# Patient Record
Sex: MALE | Race: WHITE | URBAN - METROPOLITAN AREA
[De-identification: names, ages, dates, MRNs, and addresses within clinical notes are randomized per-mention and may not be internally consistent; named-entity substitution may affect disease eponyms.]

---

## 2022-05-15 ENCOUNTER — HOSPITAL ENCOUNTER (EMERGENCY)
Age: 37
Discharge: HOME | End: 2022-05-15

## 2022-05-15 VITALS
RESPIRATION RATE: 14 BRPM | HEIGHT: 67 IN | TEMPERATURE: 98.5 F | WEIGHT: 200 LBS | BODY MASS INDEX: 31.39 KG/M2 | SYSTOLIC BLOOD PRESSURE: 150 MMHG | OXYGEN SATURATION: 96 % | DIASTOLIC BLOOD PRESSURE: 85 MMHG | HEART RATE: 97 BPM

## 2022-05-15 DIAGNOSIS — K04.7 DENTAL ABSCESS: Primary | ICD-10-CM

## 2022-05-15 PROCEDURE — 6370000000 HC RX 637 (ALT 250 FOR IP): Performed by: PHYSICIAN ASSISTANT

## 2022-05-15 PROCEDURE — 99283 EMERGENCY DEPT VISIT LOW MDM: CPT

## 2022-05-15 RX ORDER — OXYCODONE HYDROCHLORIDE AND ACETAMINOPHEN 5; 325 MG/1; MG/1
1 TABLET ORAL ONCE
Status: DISCONTINUED | OUTPATIENT
Start: 2022-05-15 | End: 2022-05-15

## 2022-05-15 RX ORDER — AMOXICILLIN AND CLAVULANATE POTASSIUM 875; 125 MG/1; MG/1
1 TABLET, FILM COATED ORAL 2 TIMES DAILY
Qty: 20 TABLET | Refills: 0 | Status: SHIPPED | OUTPATIENT
Start: 2022-05-15 | End: 2022-05-25

## 2022-05-15 RX ORDER — IBUPROFEN 800 MG/1
800 TABLET ORAL ONCE
Status: COMPLETED | OUTPATIENT
Start: 2022-05-15 | End: 2022-05-15

## 2022-05-15 RX ORDER — IBUPROFEN 600 MG/1
600 TABLET ORAL 3 TIMES DAILY PRN
Qty: 30 TABLET | Refills: 0 | Status: SHIPPED | OUTPATIENT
Start: 2022-05-15

## 2022-05-15 RX ORDER — AMOXICILLIN AND CLAVULANATE POTASSIUM 875; 125 MG/1; MG/1
1 TABLET, FILM COATED ORAL ONCE
Status: COMPLETED | OUTPATIENT
Start: 2022-05-15 | End: 2022-05-15

## 2022-05-15 RX ADMIN — IBUPROFEN 800 MG: 800 TABLET, FILM COATED ORAL at 13:21

## 2022-05-15 RX ADMIN — AMOXICILLIN AND CLAVULANATE POTASSIUM 1 TABLET: 875; 125 TABLET, FILM COATED ORAL at 13:21

## 2022-05-15 ASSESSMENT — PAIN DESCRIPTION - ORIENTATION: ORIENTATION: RIGHT

## 2022-05-15 ASSESSMENT — PAIN SCALES - GENERAL: PAINLEVEL_OUTOF10: 10

## 2022-05-15 ASSESSMENT — PAIN DESCRIPTION - DESCRIPTORS: DESCRIPTORS: PRESSURE;DISCOMFORT;TENDER

## 2022-05-15 ASSESSMENT — PAIN DESCRIPTION - LOCATION: LOCATION: FACE

## 2022-05-15 NOTE — ED PROVIDER NOTES
[facility]                                                                                                                                    Department of Emergency Medicine   ED  Provider Note  Admit Date/RoomTime: 5/15/2022 12:53 PM  ED Room: 31/31        HPI:  5/15/22,   Time: 12:55 PM EDT         Rogerudence Doyle is a 44 y.o. male presenting to the ED for right sided dental pain/facial swelling, beginning few days ago. The complaint has been persistent, moderate in severity, and worsened by talking and chewing. The patient states a tooth broke off about 3 weeks ago. Initially he had no issues but in past few days he started with increased pain and swelling. Has swelling over right side of face/cheek. Pt has not had any abs. States he really doesn't like to take anything for pain typically. ROS:     Constitutional: Negative for fever and chills  HENT: Negative for ear pain, sore throat and sinus pressure  Eyes: Negative for pain, discharge and redness  Respiratory:  Negative for shortness of breath, cough and wheezing  Cardiovascular: Negative for CP, edema or palpitations  Gastrointestinal: See HPI  Genitourinary: Negative for dysuria and frequency  Musculoskeletal: Negative for back pain and arthralgia  Skin: Negative for rash and wound  Neurological: Negative for weakness and headaches  Hematological: Negative for adenopathy    All other systems reviewed and are negative      -------------------------------- PAST HISTORY ----------------------------------  Past Medical History:  has no past medical history on file. Past Surgical History:  has no past surgical history on file. Social History:      Family History: family history is not on file. The patients home medications have been reviewed. Allergies: Patient has no known allergies.     --------------------------------- RESULTS ------------------------------------------  All laboratory and radiology results have been personally reviewed by myself   LABS:  No results found for this visit on 05/15/22. RADIOLOGY:  Interpreted by Radiologist.  No orders to display       ----------------- NURSING NOTES AND VITALS REVIEWED ---------------   The nursing notes within the ED encounter and vital signs as below have been reviewed. BP (!) 150/85   Pulse 97   Temp 98.5 °F (36.9 °C) (Oral)   Resp 14   Ht 5' 7\" (1.702 m)   Wt 200 lb (90.7 kg)   SpO2 96%   BMI 31.32 kg/m²   Oxygen Saturation Interpretation: Normal      --------------------------------PHYSICAL EXAM------------------------------------      Constitutional/General: Alert and oriented x3, mild distress. Marked swelling noted over right maxillary region. Mild redness. Head: NC/AT  Eyes: PERRL, EOMI  Mouth: Large bulging abscess lateral to # 3/4/5.   11 Blade used to create opening with large blood and pus drained. Tolerated well and reports improved symptoms. No trismus  Neck: Supple, full ROM, no meningeal signs  Pulmonary: Lungs clear to auscultation bilaterally, no wheezes, rales, or rhonchi. Not in respiratory distress  Cardiovascular:  Regular rate and rhythm, no murmurs, gallops, or rubs. 2+ distal pulses  Extremities: Moves all extremities x 4. Warm and well perfused  Skin: warm and dry without rash  Neurologic: GCS 15,  Intact. No focal deficits  Psych: Normal Affect      ------------------------ ED COURSE/MEDICAL DECISION MAKING----------------------  Medications   amoxicillin-clavulanate (AUGMENTIN) 875-125 MG per tablet 1 tablet (1 tablet Oral Given 5/15/22 1321)   ibuprofen (ADVIL;MOTRIN) tablet 800 mg (800 mg Oral Given 5/15/22 1321)         Medical Decision Making:    Dental abscess S/P drainage at [facility]. Feeling improved at this time. No fever or tachycardia. Plan abs for home with instructions to see dental clinic in AM.   Motrin only for pain at patients request.   Return here if worsening swelling, pain, fever or vomiting.    He is aware and agreeable to plan. Counseling: The emergency provider has spoken with the patient and discussed todays results, in addition to providing specific details for the plan of care and counseling regarding the diagnosis and prognosis. Questions are answered at this time and they are agreeable with the plan.      ------------------------ IMPRESSION AND DISPOSITION -------------------------------    IMPRESSION  1.  Dental abscess        DISPOSITION  Disposition: Discharge to home  Patient condition is stable                   Daquan Mo PA-C  05/15/22 1503

## 2024-08-30 ENCOUNTER — APPOINTMENT (OUTPATIENT)
Dept: GENERAL RADIOLOGY | Age: 39
End: 2024-08-30
Payer: COMMERCIAL

## 2024-08-30 ENCOUNTER — HOSPITAL ENCOUNTER (EMERGENCY)
Age: 39
Discharge: HOME | End: 2024-08-30
Attending: STUDENT IN AN ORGANIZED HEALTH CARE EDUCATION/TRAINING PROGRAM
Payer: COMMERCIAL

## 2024-08-30 VITALS
HEART RATE: 94 BPM | OXYGEN SATURATION: 96 % | DIASTOLIC BLOOD PRESSURE: 96 MMHG | BODY MASS INDEX: 32.96 KG/M2 | SYSTOLIC BLOOD PRESSURE: 138 MMHG | TEMPERATURE: 97.8 F | RESPIRATION RATE: 16 BRPM | WEIGHT: 210 LBS | HEIGHT: 67 IN

## 2024-08-30 DIAGNOSIS — S61.216A LACERATION OF RIGHT LITTLE FINGER WITHOUT FOREIGN BODY WITHOUT DAMAGE TO NAIL, INITIAL ENCOUNTER: Primary | ICD-10-CM

## 2024-08-30 PROCEDURE — 99283 EMERGENCY DEPT VISIT LOW MDM: CPT

## 2024-08-30 PROCEDURE — 73130 X-RAY EXAM OF HAND: CPT

## 2024-08-30 PROCEDURE — 2500000003 HC RX 250 WO HCPCS: Performed by: STUDENT IN AN ORGANIZED HEALTH CARE EDUCATION/TRAINING PROGRAM

## 2024-08-30 PROCEDURE — 6370000000 HC RX 637 (ALT 250 FOR IP)

## 2024-08-30 PROCEDURE — 12002 RPR S/N/AX/GEN/TRNK2.6-7.5CM: CPT

## 2024-08-30 RX ORDER — LIDOCAINE HYDROCHLORIDE 10 MG/ML
20 INJECTION, SOLUTION INFILTRATION; PERINEURAL ONCE
Status: COMPLETED | OUTPATIENT
Start: 2024-08-30 | End: 2024-08-30

## 2024-08-30 RX ORDER — GINSENG 100 MG
CAPSULE ORAL ONCE
Status: COMPLETED | OUTPATIENT
Start: 2024-08-30 | End: 2024-08-30

## 2024-08-30 RX ADMIN — LIDOCAINE HYDROCHLORIDE 20 ML: 10 INJECTION, SOLUTION INFILTRATION; PERINEURAL at 13:22

## 2024-08-30 RX ADMIN — BACITRACIN: 500 OINTMENT TOPICAL at 13:24

## 2024-08-30 ASSESSMENT — PAIN - FUNCTIONAL ASSESSMENT: PAIN_FUNCTIONAL_ASSESSMENT: NONE - DENIES PAIN

## 2024-08-30 NOTE — DISCHARGE INSTR - COC
Continuity of Care Form    Patient Name: Justin Pang   :  1985  MRN:  [id number]    Admit date:  2024  Discharge date:  ***    Code Status Order: No Order   Advance Directives:   Advance Care Flowsheet Documentation             Admitting Physician:  No admitting provider for patient encounter.  PCP: No primary care provider on file.    Discharging Nurse: ***  Discharging Hospital Unit/Room#:   Discharging Unit Phone Number: ***    Emergency Contact:   No emergency contact information on file.    Past Surgical History:  History reviewed. No pertinent surgical history.    Immunization History:     There is no immunization history on file for this patient.    Active Problems:  There is no problem list on file for this patient.      Isolation/Infection:   Isolation            No Isolation          Patient Infection Status       None to display            Nurse Assessment:  Last Vital Signs: BP (!) 138/96   Pulse 94   Temp 97.8 °F (36.6 °C) (Oral)   Resp 16   Ht 1.702 m (5' 7\")   Wt 95.3 kg (210 lb)   SpO2 96%   BMI 32.89 kg/m²     Last documented pain score (0-10 scale):    Last Weight:   Wt Readings from Last 1 Encounters:   24 95.3 kg (210 lb)     Mental Status:  {IP PT MENTAL STATUS:}    IV Access:  { MEGAN IV ACCESS:765401157}    Nursing Mobility/ADLs:  Walking   {CHP DME ADLs:730806981}  Transfer  {CHP DME ADLs:875991020}  Bathing  {CHP DME ADLs:206521083}  Dressing  {CHP DME ADLs:044591398}  Toileting  {CHP DME ADLs:213682286}  Feeding  {CHP DME ADLs:672238782}  Med Admin  {CHP DME ADLs:295635516}  Med Delivery   { MEGAN MED Delivery:295891994}    Wound Care Documentation and Therapy:        Elimination:  Continence:   Bowel: {YES / NO:}  Bladder: {YES / NO:}  Urinary Catheter: {Urinary Catheter:987889004}   Colostomy/Ileostomy/Ileal Conduit: {YES / NO:}       Date of Last BM: ***  No intake or output data in the 24 hours ending 24 1332  No intake/output data  days.     Update Admission H&P: {CHP DME Changes in HandP:599022298}    PHYSICIAN SIGNATURE:  {Esignature:898066504}

## 2024-08-30 NOTE — DISCHARGE INSTRUCTIONS
As discussed, return or call your PCP if you see:  Darkening around the skin, pus around the incision, or loose feeling in your finger or difficulty moving your finger.   normal gait and station , full range of motion

## 2024-08-30 NOTE — ED PROVIDER NOTES
[facility] EMERGENCY DEPARTMENT  EMERGENCY DEPARTMENT ENCOUNTER        Pt Name: Justin Pang  MRN: [id number]  Birthdate 1985  Date of evaluation: 8/30/2024  Provider: Shiva Maldonado MD  PCP: No primary care provider on file.  Note Started: 1:23 PM EDT 8/30/24    CHIEF COMPLAINT       Chief Complaint   Patient presents with    Laceration     Laceration to right pinky finger yesterday. Up to date on tetanus shot. Pt put skin glue on cut yesterday.       HISTORY OF PRESENT ILLNESS + ROS:   History From: Pt    Limitations to history : None    Justin Pang is a 39 y.o. male who presents with a right pinkie laceration. He got a laceration last night around 10 PM while moving aluminum without gloves.  He has no tingling or numbness.  His tetanus shot is up-to-date, he got it 6 months ago when he went to longterm.  It stopped bleeding on its own after a little while of keeping a bandage and pressure on it.  He came here today because he has a bit of a flap character to it and his wife told him to come.    Patient denies fever, chills, headache, shortness of breath, chest pain, abdominal pain, nausea, vomiting, diarrhea, lightheadedness, dysuria, hematuria, hematochezia, and melena.  Is this patient to be included in the SEP-1 Core Measure due to severe sepsis or septic shock?   No Exclusion criteria - the patient is NOT to be included for SEP-1 Core Measure due to: Infection is not suspected    Nursing Notes were all reviewed and agreed with or any disagreements were addressed in the HPI.      Medical Decision Making/Differential Diagnosis/ED Course:    CC/HPI Summary, Social Determinants of health, Records Reviewed, DDx, testing done/not done, ED Course, Reassessment, disposition considerations/shared decision making with patient, consults, disposition:          Medications   lidocaine 1 % injection 20 mL (20 mLs IntraDERmal Given 8/30/24 1322)   bacitracin ointment ( Topical Given

## 2025-05-02 ENCOUNTER — HOSPITAL ENCOUNTER (EMERGENCY)
Age: 40
Discharge: HOME | End: 2025-05-02
Payer: COMMERCIAL

## 2025-05-02 VITALS
RESPIRATION RATE: 18 BRPM | SYSTOLIC BLOOD PRESSURE: 129 MMHG | DIASTOLIC BLOOD PRESSURE: 88 MMHG | HEIGHT: 67 IN | HEART RATE: 94 BPM | BODY MASS INDEX: 31.39 KG/M2 | WEIGHT: 200 LBS | OXYGEN SATURATION: 95 % | TEMPERATURE: 97.9 F

## 2025-05-02 DIAGNOSIS — K64.4 EXTERNAL HEMORRHOIDS: Primary | ICD-10-CM

## 2025-05-02 LAB
ERYTHROCYTE [DISTWIDTH] IN BLOOD BY AUTOMATED COUNT: 12.1 % (ref 11.5–15)
HCT VFR BLD AUTO: 44.1 % (ref 37–54)
HGB BLD-MCNC: 15.3 G/DL (ref 12.5–16.5)
MCH RBC QN AUTO: 30.9 PG (ref 26–35)
MCHC RBC AUTO-ENTMCNC: 34.7 G/DL (ref 32–34.5)
MCV RBC AUTO: 89.1 FL (ref 80–99.9)
PLATELET # BLD AUTO: 394 K/UL (ref 130–450)
PMV BLD AUTO: 8.6 FL (ref 7–12)
RBC # BLD AUTO: 4.95 M/UL (ref 3.8–5.8)
WBC OTHER # BLD: 8.7 K/UL (ref 4.5–11.5)

## 2025-05-02 PROCEDURE — 99283 EMERGENCY DEPT VISIT LOW MDM: CPT

## 2025-05-02 PROCEDURE — 85027 COMPLETE CBC AUTOMATED: CPT

## 2025-05-02 RX ORDER — HYDROCORTISONE ACETATE 25 MG/1
25 SUPPOSITORY RECTAL 2 TIMES DAILY
Status: DISCONTINUED | OUTPATIENT
Start: 2025-05-02 | End: 2025-05-02

## 2025-05-02 RX ORDER — HYDROCORTISONE ACETATE 25 MG/1
25 SUPPOSITORY RECTAL ONCE
Status: DISCONTINUED | OUTPATIENT
Start: 2025-05-02 | End: 2025-05-02 | Stop reason: HOSPADM

## 2025-05-02 RX ORDER — HYDROCORTISONE 25 MG/G
CREAM TOPICAL 2 TIMES DAILY
Status: DISCONTINUED | OUTPATIENT
Start: 2025-05-02 | End: 2025-05-02

## 2025-05-02 RX ORDER — HYDROCORTISONE 25 MG/G
CREAM TOPICAL ONCE
Status: DISCONTINUED | OUTPATIENT
Start: 2025-05-02 | End: 2025-05-02 | Stop reason: HOSPADM

## 2025-05-02 RX ORDER — HYDROCORTISONE 25 MG/G
CREAM TOPICAL
Qty: 1 EACH | Refills: 0 | Status: SHIPPED | OUTPATIENT
Start: 2025-05-02

## 2025-05-02 RX ORDER — HYDROCORTISONE ACETATE 25 MG/1
25 SUPPOSITORY RECTAL 2 TIMES DAILY
Qty: 24 SUPPOSITORY | Refills: 0 | Status: SHIPPED | OUTPATIENT
Start: 2025-05-02 | End: 2025-05-14

## 2025-05-02 ASSESSMENT — PAIN - FUNCTIONAL ASSESSMENT: PAIN_FUNCTIONAL_ASSESSMENT: 0-10

## 2025-05-02 ASSESSMENT — PAIN SCALES - GENERAL: PAINLEVEL_OUTOF10: 2

## 2025-05-02 NOTE — ED PROVIDER NOTES
Independent [facility] Visit.      [facility]  Department of Emergency Medicine   ED  Encounter Note  Admit Date/RoomTime: 2025  7:24 PM  ED Room: 15/15    NAME: Justin Pang  : 1985  MRN: [id number]     Chief Complaint:  Hemorrhoids (Reports hx of hemorrhoids for the past two years and increasing pains. States needs evaluated. States today passed a lot of blood  )    History of Present Illness        Justin Pang is a 40 y.o. old male who presents to the emergency department by private vehicle, for evaluation of hemorrhoids.  He said over the past few years, he has been having issues with them but was never evaluated and has never tried over the counter medications.  The pain has been getting worse.  Today, he was having a bowel movement and \"filled the toilet with blood\".  He denies fevers, chills, nausea, vomiting, abdominal pain.      ROS   Pertinent positives and negatives are stated within HPI, all other systems reviewed and are negative.    Past Medical History:  has no past medical history on file.    Surgical History:  has no past surgical history on file.    Social History:  reports that he has been smoking cigarettes. He has never used smokeless tobacco.    Family History: family history is not on file.     Allergies: Patient has no known allergies.    Physical Exam   Oxygen Saturation Interpretation: Normal.        ED Triage Vitals   BP Systolic BP Percentile Diastolic BP Percentile Temp Temp Source Pulse Respirations SpO2   25 182 -- -- 25 1824 25 1824 25 1724 25 1824 25 1724   129/88   97.9 °F (36.6 °C) Oral 95 18 100 %      Height Weight - Scale         25 18225 182         1.702 m (5' 7\") 90.7 kg (200 lb)             Physical Exam  Constitutional:  Alert, development consistent with age.  No acute distress.  HEENT:  NC/NT.  Airway patent.  Neck:  Normal ROM.  Supple.  Respiratory: Respirations regular, nonlabored, no

## 2025-05-02 NOTE — ED NOTES
Department of Emergency Medicine  FIRST PROVIDER TRIAGE NOTE             [facility]           5/2/25  6:23 PM EDT    Date of Encounter: 5/2/25   MRN: [id number]      HPI: Justin Pang is a 40 y.o. male who presents to the ED for Hemorrhoids (Reports hx of hemorrhoids for the past two years and increasing pains. States needs evaluated. States today passed a lot of blood  )     TODAY STARTED BLEEDING.  HAS NOT BEEN EVALUATED AND HAS NOT TRIED ANY OTC MEDICATIONS.     ROS: Negative for cp or sob.    PE: Gen Appearance/Constitutional: alert  HEENT: NC/NT. PERRLA,  Airway patent.    Provider-Patient relationship only established for Provider In Triage (PIT)  Full assessment, HPI and examination not performed, therefore, it is not yet possible to state whether or not an emergency medical condition exists   Focused assessment only during triage. This is not a comprehensive evaluation due to no physical ER space, staff shortage and high numbers of boarding patients in the ER.       Initial Plan of Care: All treatment areas with department are currently occupied. Plan to order/Initiate the following while awaiting opening in ED.  Initiate Treatment-Testing, Proceed toTreatment Area When Bed Available for ED Attending/MLP to Continue Care    Electronically signed by BAY Barba CNP   DD: 5/2/25      Yaya Keys APRN - CNP  05/02/25 1823       Yaya Keys APRN - CNP  05/02/25 1822

## 2025-05-03 NOTE — DISCHARGE INSTRUCTIONS
USE THE MEDICATIONS AS PRESCRIBED UNTIL COMPLETED.  TAKE MIRALAX DAILY AND ADD FIBER TO YOUR DIET TO PREVENT CONSTIPATION.  FOLLOW UP WITH YOUR PRIMARY DOCTOR.  IF YOU DO NOT HAVE ONE, USE THE INFORMATION ATTACHED.    CONTACT GENERAL SURGERY FOR EVALUATION OF HEMORRHOIDS.  RETURN FOR WORSENING SYMPTOMS.

## 2025-06-22 ENCOUNTER — APPOINTMENT (OUTPATIENT)
Dept: CT IMAGING | Age: 40
End: 2025-06-22

## 2025-06-22 ENCOUNTER — HOSPITAL ENCOUNTER (EMERGENCY)
Age: 40
Discharge: HOME | End: 2025-06-23
Attending: EMERGENCY MEDICINE

## 2025-06-22 DIAGNOSIS — R89.2: ICD-10-CM
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
## 2025-06-22 DIAGNOSIS — S02.2XXA CLOSED FRACTURE OF NASAL BONE, INITIAL ENCOUNTER: ICD-10-CM

## 2025-06-22 DIAGNOSIS — F19.921 DRUG INTOXICATION WITH DELIRIUM (HCC): ICD-10-CM

## 2025-06-22 LAB
ABO + RH BLD: NORMAL
ALBUMIN SERPL-MCNC: 4.5 G/DL (ref 3.5–5.2)
ALP SERPL-CCNC: 114 U/L (ref 40–129)
ALT SERPL-CCNC: 17 U/L (ref 0–40)
AMPHET UR QL SCN: NEGATIVE
ANION GAP SERPL CALCULATED.3IONS-SCNC: 16 MMOL/L (ref 7–16)
ARM BAND NUMBER: NORMAL
AST SERPL-CCNC: 25 U/L (ref 0–39)
BARBITURATES UR QL SCN: NEGATIVE
BASOPHILS # BLD: 0.06 K/UL (ref 0–0.2)
BASOPHILS NFR BLD: 1 % (ref 0–2)
BENZODIAZ UR QL: POSITIVE
BILIRUB SERPL-MCNC: 0.2 MG/DL (ref 0–1.2)
BLOOD BANK SAMPLE EXPIRATION: NORMAL
BLOOD GROUP ANTIBODIES SERPL: NEGATIVE
BUN SERPL-MCNC: 11 MG/DL (ref 6–20)
BUPRENORPHINE UR QL: NEGATIVE
CALCIUM SERPL-MCNC: 8.7 MG/DL (ref 8.6–10.2)
CANNABINOIDS UR QL SCN: POSITIVE
CHLORIDE SERPL-SCNC: 105 MMOL/L (ref 98–107)
CK SERPL-CCNC: 400 U/L (ref 20–200)
CO2 SERPL-SCNC: 22 MMOL/L (ref 22–29)
COCAINE UR QL SCN: POSITIVE
CREAT SERPL-MCNC: 1.2 MG/DL (ref 0.7–1.2)
EOSINOPHIL # BLD: 0.28 K/UL (ref 0.05–0.5)
EOSINOPHILS RELATIVE PERCENT: 3 % (ref 0–6)
ERYTHROCYTE [DISTWIDTH] IN BLOOD BY AUTOMATED COUNT: 12.5 % (ref 11.5–15)
FENTANYL UR QL: NEGATIVE
GFR, ESTIMATED: 82 ML/MIN/1.73M2
GLUCOSE SERPL-MCNC: 107 MG/DL (ref 74–99)
HCT VFR BLD AUTO: 41.1 % (ref 37–54)
HGB BLD-MCNC: 14.4 G/DL (ref 12.5–16.5)
IMM GRANULOCYTES # BLD AUTO: 0.04 K/UL (ref 0–0.58)
IMM GRANULOCYTES NFR BLD: 1 % (ref 0–5)
INR PPP: 1
LACTATE BLDV-SCNC: 3 MMOL/L (ref 0.5–2.2)
LYMPHOCYTES NFR BLD: 2.36 K/UL (ref 1.5–4)
LYMPHOCYTES RELATIVE PERCENT: 29 % (ref 20–42)
MCH RBC QN AUTO: 31.2 PG (ref 26–35)
MCHC RBC AUTO-ENTMCNC: 35 G/DL (ref 32–34.5)
MCV RBC AUTO: 89.2 FL (ref 80–99.9)
METHADONE UR QL: NEGATIVE
MONOCYTES NFR BLD: 0.66 K/UL (ref 0.1–0.95)
MONOCYTES NFR BLD: 8 % (ref 2–12)
NEUTROPHILS NFR BLD: 59 % (ref 43–80)
NEUTS SEG NFR BLD: 4.86 K/UL (ref 1.8–7.3)
OPIATES UR QL SCN: NEGATIVE
OXYCODONE UR QL SCN: NEGATIVE
PCP UR QL SCN: NEGATIVE
PH VENOUS: 7.34 (ref 7.35–7.45)
PLATELET # BLD AUTO: 347 K/UL (ref 130–450)
PMV BLD AUTO: 9 FL (ref 7–12)
POTASSIUM SERPL-SCNC: 3.7 MMOL/L (ref 3.5–5)
PROT SERPL-MCNC: 6.9 G/DL (ref 6.4–8.3)
PROTHROMBIN TIME: 10.5 SEC (ref 9.3–12.4)
RBC # BLD AUTO: 4.61 M/UL (ref 3.8–5.8)
SODIUM SERPL-SCNC: 143 MMOL/L (ref 132–146)
TEST INFORMATION: ABNORMAL
WBC OTHER # BLD: 8.3 K/UL (ref 4.5–11.5)

## 2025-06-22 PROCEDURE — 71260 CT THORAX DX C+: CPT

## 2025-06-22 PROCEDURE — 70450 CT HEAD/BRAIN W/O DYE: CPT

## 2025-06-22 PROCEDURE — 85025 COMPLETE CBC W/AUTO DIFF WBC: CPT

## 2025-06-22 PROCEDURE — 80179 DRUG ASSAY SALICYLATE: CPT

## 2025-06-22 PROCEDURE — 74177 CT ABD & PELVIS W/CONTRAST: CPT

## 2025-06-22 PROCEDURE — 93005 ELECTROCARDIOGRAM TRACING: CPT | Performed by: EMERGENCY MEDICINE

## 2025-06-22 PROCEDURE — 86901 BLOOD TYPING SEROLOGIC RH(D): CPT

## 2025-06-22 PROCEDURE — 86900 BLOOD TYPING SEROLOGIC ABO: CPT

## 2025-06-22 PROCEDURE — 96360 HYDRATION IV INFUSION INIT: CPT

## 2025-06-22 PROCEDURE — 2500000003 HC RX 250 WO HCPCS

## 2025-06-22 PROCEDURE — 72125 CT NECK SPINE W/O DYE: CPT

## 2025-06-22 PROCEDURE — 82550 ASSAY OF CK (CPK): CPT

## 2025-06-22 PROCEDURE — 80053 COMPREHEN METABOLIC PANEL: CPT

## 2025-06-22 PROCEDURE — 2580000003 HC RX 258: Performed by: EMERGENCY MEDICINE

## 2025-06-22 PROCEDURE — 6360000004 HC RX CONTRAST MEDICATION: Performed by: RADIOLOGY

## 2025-06-22 PROCEDURE — G0480 DRUG TEST DEF 1-7 CLASSES: HCPCS

## 2025-06-22 PROCEDURE — 6360000002 HC RX W HCPCS

## 2025-06-22 PROCEDURE — 86850 RBC ANTIBODY SCREEN: CPT

## 2025-06-22 PROCEDURE — 80307 DRUG TEST PRSMV CHEM ANLYZR: CPT

## 2025-06-22 PROCEDURE — 99285 EMERGENCY DEPT VISIT HI MDM: CPT

## 2025-06-22 PROCEDURE — 80143 DRUG ASSAY ACETAMINOPHEN: CPT

## 2025-06-22 PROCEDURE — 83605 ASSAY OF LACTIC ACID: CPT

## 2025-06-22 PROCEDURE — 70486 CT MAXILLOFACIAL W/O DYE: CPT

## 2025-06-22 PROCEDURE — 82800 BLOOD PH: CPT

## 2025-06-22 PROCEDURE — 85610 PROTHROMBIN TIME: CPT

## 2025-06-22 RX ORDER — IOPAMIDOL 755 MG/ML
75 INJECTION, SOLUTION INTRAVASCULAR
Status: COMPLETED | OUTPATIENT
Start: 2025-06-22 | End: 2025-06-22

## 2025-06-22 RX ORDER — TETRACAINE HYDROCHLORIDE 5 MG/ML
2 SOLUTION OPHTHALMIC ONCE
Status: DISCONTINUED | OUTPATIENT
Start: 2025-06-22 | End: 2025-06-23 | Stop reason: HOSPADM

## 2025-06-22 RX ORDER — KETAMINE HYDROCHLORIDE 100 MG/ML
INJECTION, SOLUTION INTRAMUSCULAR; INTRAVENOUS
Status: COMPLETED
Start: 2025-06-22 | End: 2025-06-22

## 2025-06-22 RX ORDER — MIDAZOLAM HYDROCHLORIDE 1 MG/ML
INJECTION, SOLUTION INTRAMUSCULAR; INTRAVENOUS
Status: DISCONTINUED
Start: 2025-06-22 | End: 2025-06-22 | Stop reason: WASHOUT

## 2025-06-22 RX ORDER — MIDAZOLAM HYDROCHLORIDE 1 MG/ML
INJECTION, SOLUTION INTRAMUSCULAR; INTRAVENOUS
Status: COMPLETED
Start: 2025-06-22 | End: 2025-06-22

## 2025-06-22 RX ORDER — 0.9 % SODIUM CHLORIDE 0.9 %
1000 INTRAVENOUS SOLUTION INTRAVENOUS ONCE
Status: COMPLETED | OUTPATIENT
Start: 2025-06-22 | End: 2025-06-22

## 2025-06-22 RX ADMIN — KETAMINE HYDROCHLORIDE 500 MG: 100 INJECTION, SOLUTION, CONCENTRATE INTRAMUSCULAR; INTRAVENOUS at 20:31

## 2025-06-22 RX ADMIN — SODIUM CHLORIDE 1000 ML: 0.9 INJECTION, SOLUTION INTRAVENOUS at 21:49

## 2025-06-22 RX ADMIN — MIDAZOLAM HYDROCHLORIDE 2 MG: 1 INJECTION, SOLUTION INTRAMUSCULAR; INTRAVENOUS at 20:31

## 2025-06-22 RX ADMIN — IOPAMIDOL 75 ML: 755 INJECTION, SOLUTION INTRAVENOUS at 20:36

## 2025-06-22 RX ADMIN — SODIUM CHLORIDE 1000 ML: 0.9 INJECTION, SOLUTION INTRAVENOUS at 21:50

## 2025-06-23 VITALS
SYSTOLIC BLOOD PRESSURE: 158 MMHG | BODY MASS INDEX: 31.32 KG/M2 | RESPIRATION RATE: 27 BRPM | TEMPERATURE: 97.8 F | HEART RATE: 101 BPM | DIASTOLIC BLOOD PRESSURE: 94 MMHG | WEIGHT: 200 LBS | OXYGEN SATURATION: 95 %

## 2025-06-23 LAB
APAP SERPL-MCNC: <5 UG/ML (ref 10–30)
EKG ATRIAL RATE: 98 BPM
EKG P AXIS: 61 DEGREES
EKG P-R INTERVAL: 164 MS
EKG Q-T INTERVAL: 356 MS
EKG QRS DURATION: 112 MS
EKG QTC CALCULATION (BAZETT): 454 MS
EKG R AXIS: 26 DEGREES
EKG T AXIS: 34 DEGREES
EKG VENTRICULAR RATE: 98 BPM
ETHANOLAMINE SERPL-MCNC: 197 MG/DL (ref 0–0.08)
LACTATE BLDV-SCNC: 1.3 MMOL/L (ref 0.5–2.2)
SALICYLATES SERPL-MCNC: <0.3 MG/DL (ref 0–30)
TOXIC TRICYCLIC SC,BLOOD: NEGATIVE

## 2025-06-23 PROCEDURE — 6360000002 HC RX W HCPCS

## 2025-06-23 PROCEDURE — 90471 IMMUNIZATION ADMIN: CPT

## 2025-06-23 PROCEDURE — 93010 ELECTROCARDIOGRAM REPORT: CPT | Performed by: INTERNAL MEDICINE

## 2025-06-23 PROCEDURE — 90714 TD VACC NO PRESV 7 YRS+ IM: CPT

## 2025-06-23 PROCEDURE — 83605 ASSAY OF LACTIC ACID: CPT

## 2025-06-23 RX ORDER — LIDOCAINE HYDROCHLORIDE 10 MG/ML
INJECTION, SOLUTION INFILTRATION; PERINEURAL
Status: DISCONTINUED
Start: 2025-06-23 | End: 2025-06-23 | Stop reason: HOSPADM

## 2025-06-23 RX ADMIN — CLOSTRIDIUM TETANI TOXOID ANTIGEN (FORMALDEHYDE INACTIVATED) AND CORYNEBACTERIUM DIPHTHERIAE TOXOID ANTIGEN (FORMALDEHYDE INACTIVATED) 0.5 ML: 5; 2 INJECTION, SUSPENSION INTRAMUSCULAR at 03:41

## 2025-06-23 NOTE — ED NOTES
Pt discharged with belongings. Chain, wallet, shoes. RN allowed patient to check pants that were cut off before leaving to ensure he had all of his things.
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
## 2025-06-23 NOTE — DISCHARGE INSTRUCTIONS
Call 911 anytime you think you may need emergency care. For example, call if:    You have a seizure.  You passed out (lost consciousness).  You have tingling, weakness, or numbness on one side of your body.  Call your doctor now or seek immediate medical care if:    You have a severe headache.  You develop double vision.  You have a fever and stiff neck.  Clear, watery fluid drains from your nose.  You feel dizzy or lightheaded.  You have new eye pain or changes in your vision, such as blurring.  You have new ear pain, ringing in your ears, or trouble hearing.  You are confused, irritable, or not acting normally.  You have a hard time standing, walking, or talking.  You have new mouth or tooth pain, or you have trouble chewing.  You have increasing pain even after you have taken your pain medicine.  Watch closely for changes in your health, and be sure to contact your doctor if:    You develop a cough, cold, or sinus infection.  The symptoms from your injury are not steadily improving.

## 2025-06-23 NOTE — ED NOTES
Provider notified of upper lip laceration. Doctor assessed and stitches placed at this time. Pt given new clothes and discharge papers. Sober ride in room.

## 2025-06-23 NOTE — ED NOTES
Name: Justin Pang  : 1985  MRN: [id number]    Date: 2025    Benefits of immediately proceeding with Radiology exam outweigh the risks and therefore the following is being waived:      [] Pregnancy test    [] Protocol for Iodine allergy    [x] MRI questionnaire    [x] BUN/Creatinine        Shiva Maldonado MD
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
